# Patient Record
(demographics unavailable — no encounter records)

---

## 2025-01-21 NOTE — HISTORY OF PRESENT ILLNESS
[de-identified] : Lower Back Pain Fell while walking to work on the street Accident happened this morning 1/21/2025 Pain level: 7-8/10 Numbness/tingling: RT heel tingling sensation  Images: None Allergies: NKDA Meds: Ozempic, Metformin, Wellbutrin, Zoloft, Lipitor, Singular, Clonazepam Surgeries: None